# Patient Record
Sex: FEMALE | Employment: OTHER | URBAN - METROPOLITAN AREA
[De-identification: names, ages, dates, MRNs, and addresses within clinical notes are randomized per-mention and may not be internally consistent; named-entity substitution may affect disease eponyms.]

---

## 2018-05-29 ENCOUNTER — NEW PATIENT (OUTPATIENT)
Dept: URBAN - METROPOLITAN AREA CLINIC 11 | Facility: CLINIC | Age: 83
End: 2018-05-29

## 2018-05-29 ASSESSMENT — VISUAL ACUITY
OD_SC: 20/80+1
OS_SC: 20/400

## 2018-05-29 ASSESSMENT — TONOMETRY
OD_IOP_MMHG: 20
OS_IOP_MMHG: 17

## 2018-05-29 NOTE — PATIENT DISCUSSION
Discussed with patient that her cataracts are worsening and I think she would benefit from cataract surgery [facility].  Patient has dry macular degeneration and is cleared for cataract surgery. Patient will see Dr. Daniela Navarro for cataract evaluation and surgery.